# Patient Record
Sex: FEMALE | Race: WHITE | Employment: OTHER | URBAN - METROPOLITAN AREA
[De-identification: names, ages, dates, MRNs, and addresses within clinical notes are randomized per-mention and may not be internally consistent; named-entity substitution may affect disease eponyms.]

---

## 2019-05-04 ENCOUNTER — APPOINTMENT (OUTPATIENT)
Dept: GENERAL RADIOLOGY | Age: 84
End: 2019-05-04
Attending: EMERGENCY MEDICINE
Payer: MEDICARE

## 2019-05-04 ENCOUNTER — HOSPITAL ENCOUNTER (OUTPATIENT)
Age: 84
Setting detail: OBSERVATION
Discharge: HOME | End: 2019-05-05
Attending: EMERGENCY MEDICINE | Admitting: EMERGENCY MEDICINE
Payer: MEDICARE

## 2019-05-04 DIAGNOSIS — I45.9 HEART BLOCK: ICD-10-CM

## 2019-05-04 DIAGNOSIS — R50.9 FEVER, UNSPECIFIED FEVER CAUSE: Primary | ICD-10-CM

## 2019-05-04 DIAGNOSIS — R41.0 DELIRIUM: ICD-10-CM

## 2019-05-04 LAB
ALBUMIN SERPL-MCNC: 3.5 G/DL (ref 3.4–5)
ALBUMIN/GLOB SERPL: 1.2 {RATIO} (ref 0.8–1.7)
ALP SERPL-CCNC: 52 U/L (ref 45–117)
ALT SERPL-CCNC: 19 U/L (ref 13–56)
ANION GAP SERPL CALC-SCNC: 10 MMOL/L (ref 3–18)
APPEARANCE UR: CLEAR
AST SERPL-CCNC: 21 U/L (ref 15–37)
BACTERIA URNS QL MICRO: ABNORMAL /HPF
BASOPHILS # BLD: 0 K/UL (ref 0–0.1)
BASOPHILS NFR BLD: 0 % (ref 0–2)
BILIRUB SERPL-MCNC: 0.4 MG/DL (ref 0.2–1)
BILIRUB UR QL: NEGATIVE
BUN SERPL-MCNC: 18 MG/DL (ref 7–18)
BUN/CREAT SERPL: 18 (ref 12–20)
CALCIUM SERPL-MCNC: 8.4 MG/DL (ref 8.5–10.1)
CHLORIDE SERPL-SCNC: 103 MMOL/L (ref 100–108)
CK MB CFR SERPL CALC: 0.9 % (ref 0–4)
CK MB SERPL-MCNC: 1.2 NG/ML (ref 5–25)
CK SERPL-CCNC: 133 U/L (ref 26–192)
CO2 SERPL-SCNC: 24 MMOL/L (ref 21–32)
COLOR UR: YELLOW
CREAT SERPL-MCNC: 1.01 MG/DL (ref 0.6–1.3)
DIFFERENTIAL METHOD BLD: ABNORMAL
EOSINOPHIL # BLD: 0 K/UL (ref 0–0.4)
EOSINOPHIL NFR BLD: 0 % (ref 0–5)
EPITH CASTS URNS QL MICRO: 0 /LPF (ref 0–5)
ERYTHROCYTE [DISTWIDTH] IN BLOOD BY AUTOMATED COUNT: 15.3 % (ref 11.6–14.5)
GLOBULIN SER CALC-MCNC: 3 G/DL (ref 2–4)
GLUCOSE SERPL-MCNC: 150 MG/DL (ref 74–99)
GLUCOSE UR STRIP.AUTO-MCNC: NEGATIVE MG/DL
HCT VFR BLD AUTO: 34.6 % (ref 35–45)
HGB BLD-MCNC: 11.1 G/DL (ref 12–16)
HGB UR QL STRIP: ABNORMAL
KETONES UR QL STRIP.AUTO: ABNORMAL MG/DL
LACTATE BLD-SCNC: 1.97 MMOL/L (ref 0.4–2)
LEUKOCYTE ESTERASE UR QL STRIP.AUTO: NEGATIVE
LYMPHOCYTES # BLD: 0.5 K/UL (ref 0.9–3.6)
LYMPHOCYTES NFR BLD: 7 % (ref 21–52)
MCH RBC QN AUTO: 30.2 PG (ref 24–34)
MCHC RBC AUTO-ENTMCNC: 32.1 G/DL (ref 31–37)
MCV RBC AUTO: 94 FL (ref 74–97)
MONOCYTES # BLD: 0.6 K/UL (ref 0.05–1.2)
MONOCYTES NFR BLD: 7 % (ref 3–10)
NEUTS SEG # BLD: 6.8 K/UL (ref 1.8–8)
NEUTS SEG NFR BLD: 86 % (ref 40–73)
NITRITE UR QL STRIP.AUTO: NEGATIVE
PH UR STRIP: 7 [PH] (ref 5–8)
PLATELET # BLD AUTO: 169 K/UL (ref 135–420)
PMV BLD AUTO: 10.2 FL (ref 9.2–11.8)
POTASSIUM SERPL-SCNC: 3.9 MMOL/L (ref 3.5–5.5)
PROT SERPL-MCNC: 6.5 G/DL (ref 6.4–8.2)
PROT UR STRIP-MCNC: 30 MG/DL
RBC # BLD AUTO: 3.68 M/UL (ref 4.2–5.3)
RBC #/AREA URNS HPF: ABNORMAL /HPF (ref 0–5)
SODIUM SERPL-SCNC: 137 MMOL/L (ref 136–145)
SP GR UR REFRACTOMETRY: 1.02 (ref 1–1.03)
TROPONIN I SERPL-MCNC: <0.02 NG/ML (ref 0–0.04)
UROBILINOGEN UR QL STRIP.AUTO: 1 EU/DL (ref 0.2–1)
WBC # BLD AUTO: 7.9 K/UL (ref 4.6–13.2)
WBC URNS QL MICRO: ABNORMAL /HPF (ref 0–5)

## 2019-05-04 PROCEDURE — 99285 EMERGENCY DEPT VISIT HI MDM: CPT

## 2019-05-04 PROCEDURE — 83605 ASSAY OF LACTIC ACID: CPT

## 2019-05-04 PROCEDURE — 85025 COMPLETE CBC W/AUTO DIFF WBC: CPT

## 2019-05-04 PROCEDURE — 87040 BLOOD CULTURE FOR BACTERIA: CPT

## 2019-05-04 PROCEDURE — 74011250637 HC RX REV CODE- 250/637: Performed by: EMERGENCY MEDICINE

## 2019-05-04 PROCEDURE — 93005 ELECTROCARDIOGRAM TRACING: CPT

## 2019-05-04 PROCEDURE — 80053 COMPREHEN METABOLIC PANEL: CPT

## 2019-05-04 PROCEDURE — 71045 X-RAY EXAM CHEST 1 VIEW: CPT

## 2019-05-04 PROCEDURE — 74011000250 HC RX REV CODE- 250: Performed by: EMERGENCY MEDICINE

## 2019-05-04 PROCEDURE — 81001 URINALYSIS AUTO W/SCOPE: CPT

## 2019-05-04 PROCEDURE — 74011250636 HC RX REV CODE- 250/636: Performed by: EMERGENCY MEDICINE

## 2019-05-04 PROCEDURE — 87086 URINE CULTURE/COLONY COUNT: CPT

## 2019-05-04 PROCEDURE — 96374 THER/PROPH/DIAG INJ IV PUSH: CPT

## 2019-05-04 PROCEDURE — 82550 ASSAY OF CK (CPK): CPT

## 2019-05-04 PROCEDURE — 96361 HYDRATE IV INFUSION ADD-ON: CPT

## 2019-05-04 RX ORDER — PRAVASTATIN SODIUM 10 MG/1
TABLET ORAL
COMMUNITY

## 2019-05-04 RX ORDER — PREDNISONE 1 MG/1
TABLET ORAL
COMMUNITY

## 2019-05-04 RX ORDER — OXYCODONE AND ACETAMINOPHEN 10; 325 MG/1; MG/1
TABLET ORAL
COMMUNITY

## 2019-05-04 RX ORDER — ACETAMINOPHEN 325 MG/1
650 TABLET ORAL
Status: COMPLETED | OUTPATIENT
Start: 2019-05-04 | End: 2019-05-04

## 2019-05-04 RX ORDER — SODIUM CHLORIDE 0.9 % (FLUSH) 0.9 %
5-10 SYRINGE (ML) INJECTION AS NEEDED
Status: DISCONTINUED | OUTPATIENT
Start: 2019-05-04 | End: 2019-05-05 | Stop reason: HOSPADM

## 2019-05-04 RX ADMIN — WATER 2 G: 1 INJECTION INTRAMUSCULAR; INTRAVENOUS; SUBCUTANEOUS at 22:00

## 2019-05-04 RX ADMIN — ACETAMINOPHEN 650 MG: 325 TABLET ORAL at 21:23

## 2019-05-04 RX ADMIN — SODIUM CHLORIDE 1000 ML: 900 INJECTION, SOLUTION INTRAVENOUS at 21:23

## 2019-05-05 ENCOUNTER — APPOINTMENT (OUTPATIENT)
Dept: CT IMAGING | Age: 84
End: 2019-05-05
Attending: EMERGENCY MEDICINE
Payer: MEDICARE

## 2019-05-05 VITALS
HEART RATE: 66 BPM | SYSTOLIC BLOOD PRESSURE: 115 MMHG | WEIGHT: 125 LBS | RESPIRATION RATE: 18 BRPM | DIASTOLIC BLOOD PRESSURE: 42 MMHG | BODY MASS INDEX: 17.9 KG/M2 | HEIGHT: 70 IN | TEMPERATURE: 98.5 F | OXYGEN SATURATION: 95 %

## 2019-05-05 PROBLEM — R41.0 DELIRIUM: Status: ACTIVE | Noted: 2019-05-05

## 2019-05-05 PROBLEM — I45.9 HEART BLOCK: Status: ACTIVE | Noted: 2019-05-05

## 2019-05-05 PROBLEM — R50.9 FEVER: Status: ACTIVE | Noted: 2019-05-05

## 2019-05-05 PROCEDURE — 74011250637 HC RX REV CODE- 250/637: Performed by: FAMILY MEDICINE

## 2019-05-05 PROCEDURE — 72132 CT LUMBAR SPINE W/DYE: CPT

## 2019-05-05 PROCEDURE — 99218 HC RM OBSERVATION: CPT

## 2019-05-05 PROCEDURE — 74011636637 HC RX REV CODE- 636/637: Performed by: FAMILY MEDICINE

## 2019-05-05 PROCEDURE — 74011250636 HC RX REV CODE- 250/636: Performed by: EMERGENCY MEDICINE

## 2019-05-05 PROCEDURE — 74011250636 HC RX REV CODE- 250/636: Performed by: FAMILY MEDICINE

## 2019-05-05 PROCEDURE — 74011636320 HC RX REV CODE- 636/320: Performed by: EMERGENCY MEDICINE

## 2019-05-05 PROCEDURE — 70450 CT HEAD/BRAIN W/O DYE: CPT

## 2019-05-05 PROCEDURE — 96372 THER/PROPH/DIAG INJ SC/IM: CPT

## 2019-05-05 PROCEDURE — 96367 TX/PROPH/DG ADDL SEQ IV INF: CPT

## 2019-05-05 RX ORDER — LEVOTHYROXINE SODIUM 50 UG/1
50 TABLET ORAL
Status: DISCONTINUED | OUTPATIENT
Start: 2019-05-06 | End: 2019-05-05 | Stop reason: HOSPADM

## 2019-05-05 RX ORDER — SODIUM CHLORIDE 9 MG/ML
125 INJECTION, SOLUTION INTRAVENOUS CONTINUOUS
Status: DISCONTINUED | OUTPATIENT
Start: 2019-05-05 | End: 2019-05-05 | Stop reason: HOSPADM

## 2019-05-05 RX ORDER — SODIUM CHLORIDE 0.9 % (FLUSH) 0.9 %
5-40 SYRINGE (ML) INJECTION EVERY 8 HOURS
Status: DISCONTINUED | OUTPATIENT
Start: 2019-05-05 | End: 2019-05-05 | Stop reason: HOSPADM

## 2019-05-05 RX ORDER — HEPARIN SODIUM 5000 [USP'U]/ML
5000 INJECTION, SOLUTION INTRAVENOUS; SUBCUTANEOUS EVERY 8 HOURS
Status: DISCONTINUED | OUTPATIENT
Start: 2019-05-05 | End: 2019-05-05 | Stop reason: HOSPADM

## 2019-05-05 RX ORDER — ASPIRIN 325 MG
325 TABLET ORAL DAILY
Status: DISCONTINUED | OUTPATIENT
Start: 2019-05-05 | End: 2019-05-05 | Stop reason: HOSPADM

## 2019-05-05 RX ORDER — LEVOTHYROXINE SODIUM 50 UG/1
50 TABLET ORAL
Qty: 30 TAB | Refills: 0 | Status: SHIPPED | OUTPATIENT
Start: 2019-05-06

## 2019-05-05 RX ORDER — AZITHROMYCIN 250 MG/1
250 TABLET, FILM COATED ORAL DAILY
Status: DISCONTINUED | OUTPATIENT
Start: 2019-05-06 | End: 2019-05-05 | Stop reason: HOSPADM

## 2019-05-05 RX ORDER — FOLIC ACID 1 MG/1
1 TABLET ORAL DAILY
Status: DISCONTINUED | OUTPATIENT
Start: 2019-05-05 | End: 2019-05-05 | Stop reason: HOSPADM

## 2019-05-05 RX ORDER — ACETAMINOPHEN 325 MG/1
650 TABLET ORAL
Status: DISCONTINUED | OUTPATIENT
Start: 2019-05-05 | End: 2019-05-05 | Stop reason: HOSPADM

## 2019-05-05 RX ORDER — LEVOTHYROXINE SODIUM 50 UG/1
50 TABLET ORAL
Status: DISCONTINUED | OUTPATIENT
Start: 2019-05-05 | End: 2019-05-05

## 2019-05-05 RX ORDER — OXYCODONE AND ACETAMINOPHEN 5; 325 MG/1; MG/1
1 TABLET ORAL
Status: DISCONTINUED | OUTPATIENT
Start: 2019-05-05 | End: 2019-05-05 | Stop reason: HOSPADM

## 2019-05-05 RX ORDER — AZITHROMYCIN 250 MG/1
TABLET, FILM COATED ORAL
Qty: 6 TAB | Refills: 0 | Status: SHIPPED | OUTPATIENT
Start: 2019-05-06 | End: 2019-05-10

## 2019-05-05 RX ORDER — PREDNISONE 5 MG/1
5 TABLET ORAL
Status: DISCONTINUED | OUTPATIENT
Start: 2019-05-05 | End: 2019-05-05 | Stop reason: HOSPADM

## 2019-05-05 RX ORDER — PRAVASTATIN SODIUM 20 MG/1
10 TABLET ORAL
Status: DISCONTINUED | OUTPATIENT
Start: 2019-05-05 | End: 2019-05-05 | Stop reason: HOSPADM

## 2019-05-05 RX ORDER — SODIUM CHLORIDE 0.9 % (FLUSH) 0.9 %
5-40 SYRINGE (ML) INJECTION AS NEEDED
Status: DISCONTINUED | OUTPATIENT
Start: 2019-05-05 | End: 2019-05-05 | Stop reason: HOSPADM

## 2019-05-05 RX ORDER — ASPIRIN 325 MG
325 TABLET ORAL DAILY
COMMUNITY

## 2019-05-05 RX ADMIN — PRAVASTATIN SODIUM 10 MG: 20 TABLET ORAL at 04:43

## 2019-05-05 RX ADMIN — FOLIC ACID 1 MG: 1 TABLET ORAL at 09:05

## 2019-05-05 RX ADMIN — LEVOTHYROXINE SODIUM 50 MCG: 50 TABLET ORAL at 06:04

## 2019-05-05 RX ADMIN — PREDNISONE 5 MG: 5 TABLET ORAL at 09:05

## 2019-05-05 RX ADMIN — ACETAMINOPHEN 650 MG: 325 TABLET ORAL at 04:51

## 2019-05-05 RX ADMIN — IOPAMIDOL 100 ML: 612 INJECTION, SOLUTION INTRAVENOUS at 00:20

## 2019-05-05 RX ADMIN — SODIUM CHLORIDE 500 MG: 900 INJECTION, SOLUTION INTRAVENOUS at 04:48

## 2019-05-05 RX ADMIN — SODIUM CHLORIDE 125 ML/HR: 900 INJECTION, SOLUTION INTRAVENOUS at 04:41

## 2019-05-05 RX ADMIN — HEPARIN SODIUM 5000 UNITS: 5000 INJECTION INTRAVENOUS; SUBCUTANEOUS at 11:48

## 2019-05-05 RX ADMIN — HEPARIN SODIUM 5000 UNITS: 5000 INJECTION INTRAVENOUS; SUBCUTANEOUS at 04:43

## 2019-05-05 RX ADMIN — ASPIRIN 325 MG ORAL TABLET 325 MG: 325 PILL ORAL at 09:05

## 2019-05-05 NOTE — PROGRESS NOTES
Hospitalist Progress Note Patient: Nico Fiore MRN: [id number]  CSN: [id number] YOB: 1933  Age: 80 y.o. Sex: female DOA: 5/4/2019 LOS:  LOS: 0 days Assessment and Plan: 
 
Principal Problem: 
  Delirium (5/5/2019) Active Problems: 
  Fever (5/5/2019) Heart block (5/5/2019) She is back to baseline and watns to go home  . She understands she had a transient episode of heart block and this is potentially a problem. She understands risk and wants to drive back to [facility] anyway Chief complaint:  Altered mental status Subjective: 
 
Feels back to normal   
 
 
Review of systems: 
 
General: No fevers or chills. Cardiovascular: No chest pain or pressure. No palpitations. Pulmonary: No shortness of breath. Gastrointestinal: No nausea, vomiting. Objective: 
 
Vital signs/Intake and Output: 
Visit Vitals /42 (BP 1 Location: Left arm, BP Patient Position: At rest;Supine) Pulse 66 Temp 98.5 °F (36.9 °C) Resp 18 Ht 5' 10\" (1.778 m) Wt 56.7 kg (125 lb) SpO2 95% BMI 17.94 kg/m² Current Shift:  05/05 0701 - 05/05 1900 In: 240 [P.O.:240] Out: - Last three shifts:  05/03 1901 - 05/05 0700 In: 659.6 [P.O.:120; I.V.:539.6] Out: 300 [Urine:300] Physical Exam: 
General: NAD, AAOx3. Non-toxic. HEENT: NC/AT. PERRLA, EOMI.  MMM. Lungs: Nml inspection. CTA B/L. No wheezing, rales or rhonchi. Heart:  S1S2 RRR,  PMI mid 5th IC space. No M/RG. Abdomen: Soft, NT/ND.  BS+. No peritoneal signs. Extremities: No C/C/E. Psych:   Nml affect. Neurologic:  2-12 intact. Strength 5/5 throughout. Sensation symmetrical. 
 
 
 
 
Labs: Results:  
   
Chemistry Recent Labs 05/04/19 2058 *   
K 3.9  CO2 24 BUN 18 CREA 1.01  
CA 8.4* AGAP 10 BUCR 18 AP 52  
TP 6.5 ALB 3.5 GLOB 3.0 AGRAT 1.2  
  
CBC w/Diff Recent Labs 05/04/19 2058 WBC 7.9  
RBC 3.68* HGB 11.1*  
HCT 34.6*  
  GRANS 86* LYMPH 7*  
EOS 0 Cardiac Enzymes Recent Labs 05/04/19 2058  CKND1 0.9 Coagulation No results for input(s): PTP, INR, APTT in the last 72 hours. No lab exists for component: INREXT, INREXT Lipid Panel No results found for: CHOL, CHOLPOCT, CHOLX, CHLST, CHOLV, 394941, HDL, LDL, LDLC, DLDLP, 169873, VLDLC, VLDL, TGLX, TRIGL, TRIGP, TGLPOCT, CHHD, CHHDX  
BNP No results for input(s): BNPP in the last 72 hours. Liver Enzymes Recent Labs 05/04/19 2058 TP 6.5 ALB 3.5 AP 52 SGOT 21 Thyroid Studies No results found for: T4, T3U, TSH, TSHEXT, TSHEXT Procedures/imaging: see electronic medical records for all procedures/Xrays and details which were not copied into this note but were reviewed prior to creation of Plan

## 2019-05-05 NOTE — DISCHARGE SUMMARY
Discharge Summary    Patient: Franki Marquis MRN: [id number]  CSN: [id number]    YOB: 1933  Age: 80 y.o. Sex: female    DOA: 5/4/2019 LOS:  LOS: 0 days   Discharge Date:      Primary Care Provider:  Other, MD Yenifer    Admission Diagnoses: Fever [R50.9]  Delirium [R41.0]    Discharge Diagnoses:    Problem List as of 5/5/2019 Date Reviewed: 5/5/2019          Codes Class Noted - Resolved    * (Principal) Delirium ICD-10-CM: R41.0  ICD-9-CM: 780.09  5/5/2019 - Present        Fever ICD-10-CM: R50.9  ICD-9-CM: 780.60  5/5/2019 - Present        Heart block ICD-10-CM: I45.9  ICD-9-CM: 426.9  5/5/2019 - Present              Discharge Medications:     Current Discharge Medication List      START taking these medications    Details   azithromycin (ZITHROMAX) 250 mg tablet Take once a day  Qty: 6 Tab, Refills: 0      levothyroxine (SYNTHROID) 50 mcg tablet Take 1 Tab by mouth every morning. Qty: 30 Tab, Refills: 0         CONTINUE these medications which have NOT CHANGED    Details   aspirin (ASPIRIN) 325 mg tablet Take 325 mg by mouth daily. Indications: Joint Damage causing Pain and Loss of Function      pravastatin (PRAVACHOL) 10 mg tablet Take  by mouth nightly. predniSONE (DELTASONE) 1 mg tablet Take  by mouth daily (with breakfast). oxyCODONE-acetaminophen (PERCOCET)  mg per tablet Take  by mouth. Discharge Condition: stable        Consults: cardiology       PHYSICAL EXAM   Visit Vitals  /42 (BP 1 Location: Left arm, BP Patient Position: At rest;Supine)   Pulse 66   Temp 98.5 °F (36.9 °C)   Resp 18   Ht 5' 10\" (1.778 m)   Wt 56.7 kg (125 lb)   SpO2 95%   BMI 17.94 kg/m²     General: Awake, cooperative, no acute distress    HEENT: NC, Atraumatic. PERRLA, EOMI. Anicteric sclerae. Lungs:  CTA Bilaterally. No Wheezing/Rhonchi/Rales. Heart:  Regular  rhythm,  No murmur, No Rubs, No Gallops  Abdomen: Soft, Non distended, Non tender.  +Bowel sounds,   Extremities: No c/c/e  Psych:   Not anxious or agitated. Neurologic:  No acute neurological deficits. Admission HPI : Colleen Griffin is a 80 y.o. female who presented to ER with fever, cough, and altered mental status today.  and daughter said it was difficult to get her out of bed and she also had an episode of urinary incontinence which is unusual.  She is visiting from out of town for a . She has a history of RA on MTX started about 6 weeks ago and chronic daily prednisone. She was feeling well prior to today. Hospital Course : Admitted and placed on telemetry and given Zithromax IV empirically for cough and IVfluids. She resolved back to baseline and wanted to go [place]. She had one episode of probably transient heart block but family wants to return to [place] to get treat ent there despite risk. We will try and follo wup on urine culture but she knows it will be hard if she travels. She will go home on zithromax which isnt best drug for this. Activity: ad bertram      Diet: regular    Follow-up: With PCP in [place]    Disposition: Home      Minutes spent on discharge: 35        Labs: Results:       Chemistry Recent Labs     19   *      K 3.9      CO2 24   BUN 18   CREA 1.01   CA 8.4*   AGAP 10   BUCR 18   AP 52   TP 6.5   ALB 3.5   GLOB 3.0   AGRAT 1.2      CBC w/Diff Recent Labs     19   WBC 7.9   RBC 3.68*   HGB 11.1*   HCT 34.6*      GRANS 86*   LYMPH 7*   EOS 0      Cardiac Enzymes Recent Labs     19      CKND1 0.9      Coagulation No results for input(s): PTP, INR, APTT in the last 72 hours. No lab exists for component: INREXT, INREXT    Lipid Panel No results found for: CHOL, CHOLPOCT, CHOLX, CHLST, CHOLV, 108980, HDL, LDL, LDLC, DLDLP, 470043, VLDLC, VLDL, TGLX, TRIGL, TRIGP, TGLPOCT, CHHD, CHHDX   BNP No results for input(s): BNPP in the last 72 hours.    Liver Enzymes Recent Labs 05/04/19 2058   TP 6.5   ALB 3.5   AP 52   SGOT 21      Thyroid Studies No results found for: T4, T3U, TSH, TSHEXT, TSHEXT         Significant Diagnostic Studies: Ct Head Wo Cont    Result Date: 5/5/2019  EXAM: CT head INDICATION: Decreased alertness COMPARISON: None. TECHNIQUE: Axial CT imaging of the head was performed without intravenous contrast. One or more dose reduction techniques were used on this CT: automated exposure control, adjustment of the mAs and/or kVp according to patient size, and iterative reconstruction techniques. The specific techniques used on this CT exam have been documented in the patient's electronic medical record. Digital Imaging and Communications in Medicine (DICOM) format image data are available to nonaffiliated external healthcare facilities or entities on a secure, media free, reciprocally searchable basis with patient authorization for at least a 12 month period after this study. _______________ FINDINGS: BRAIN AND POSTERIOR FOSSA: The sulci, folia, ventricles and basal cisterns are within normal limits for the patient's age with atrophy present. There is no intracranial hemorrhage, mass effect, or midline shift. Mild periventricular low-attenuation is noted. Though nonspecific this is most often seen with chronic small vessel ischemic change. Otherwise, there are no  areas of abnormal parenchymal attenuation. EXTRA-AXIAL SPACES AND MENINGES: There are no abnormal extra-axial fluid collections. CALVARIUM: Intact. SINUSES: Opacification and mucoperiosteal thickening throughout ethmoid air cells and involving sphenoid air cells also, chronic appearing. No sinus air-fluid levels. OTHER: None. _______________     IMPRESSION: No acute intracranial abnormalities. Chronic sinusitis.     Xr Chest Port    Result Date: 5/4/2019  ============================ [facility] ============================ EXAM:AP chest xray INDICATION: Fever, tachycardia COMPARISON: None FINDINGS: The cardiomediastinal silhouette is normal. Appears to be a prosthetic heart valve. The lungs are clear. The pulmonary vasculature is not engorged. The costophrenic angles are well defined. No pneumothorax is detected. There are Bosch type rods at the thoracolumbar spine. . The trachea is midline. Suggestion of soft tissue ossification projecting adjacent to proximal left humerus. IMPRESSION: No acute cardiopulmonary process.                  CC: Other, MD Yenifer

## 2019-05-05 NOTE — ED NOTES
Pt hourly rounding competed. Safety Pt () resting on stretcher with side rails up and call bell in reach. () in chair 
  () in parents arms. Toileting Pt offered ()Bedpan 
   ()Assistance to Restroom 
   ()Urinal 
Ongoing UpdatesUpdated on plan of care and status of test results. Pain Management Inquired as to comfort and offered comfort measures: 
  () warm blankets 
 () dimmed lights

## 2019-05-05 NOTE — PROGRESS NOTES
Problem: Falls - Risk of 
Goal: *Absence of Falls Description Document Roselie Blount Fall Risk and appropriate interventions in the flowsheet. Outcome: Progressing Towards Goal 
  
Problem: Patient Education: Go to Patient Education Activity Goal: Patient/Family Education Outcome: Progressing Towards Goal 
  
Problem: Falls - Risk of 
Goal: *Absence of Falls Description Document Roselie Blount Fall Risk and appropriate interventions in the flowsheet. Outcome: Progressing Towards Goal 
  
Problem: Patient Education: Go to Patient Education Activity Goal: Patient/Family Education Outcome: Progressing Towards Goal 
  
Problem: Pressure Injury - Risk of 
Goal: *Prevention of pressure injury Description Document Nicholas Scale and appropriate interventions in the flowsheet. Outcome: Progressing Towards Goal 
  
Problem: Patient Education: Go to Patient Education Activity Goal: Patient/Family Education Outcome: Progressing Towards Goal 
  
Problem: Aspiration - Risk of 
Goal: *Absence of aspiration Outcome: Progressing Towards Goal 
  
Problem: Patient Education: Go to Patient Education Activity Goal: Patient/Family Education Outcome: Progressing Towards Goal 
  
Problem: Urinary Tract Infection - Adult Goal: *Absence of infection signs and symptoms Outcome: Progressing Towards Goal 
  
Problem: Patient Education: Go to Patient Education Activity Goal: Patient/Family Education Outcome: Progressing Towards Goal 
  
Problem: Activity Intolerance Goal: *Oxygen saturation during activity within specified parameters Outcome: Progressing Towards Goal 
Goal: *Able to remain out of bed as prescribed Outcome: Progressing Towards Goal 
  
Problem: Patient Education: Go to Patient Education Activity Goal: Patient/Family Education Outcome: Progressing Towards Goal

## 2019-05-05 NOTE — ED PROVIDER NOTES
EMERGENCY DEPARTMENT HISTORY AND PHYSICAL EXAM 
 
Date: 5/4/2019 Patient Name: Sohail Magaña History of Presenting Illness Chief Complaint Patient presents with  Fever History Provided By: Patient Chief Complaint: Fever Duration: 1 Days Timing:  Acute Location: N/A Quality: Tmax in .2F Severity: Mild Modifying Factors: No modifying factors Associated Symptoms: cough, decreased activity, confusion Additional History (Context):  
9:07 PM 
Soahil Magaña is a 80 y.o. female with no pertinent PMHX who presents via EMS to the emergency department C/O fever (Tmax in . 2F) onset 1 day ago. Associated sxs include cough, decreased activity, confusion. Pt denies myalgias, and any other sxs or complaints. PCP: Other, MD Yenifer 
 
Current Facility-Administered Medications Medication Dose Route Frequency Provider Last Rate Last Dose  sodium chloride (NS) flush 5-10 mL  5-10 mL IntraVENous PRN Uzair Shelton MD      
 vancomycin (VANCOCIN) 1,000 mg in 0.9% sodium chloride 250 mL IVPB  1,000 mg IntraVENous NOW Uzair Shelton  mL/hr at 05/05/19 0047 1,000 mg at 05/05/19 [phone] Current Outpatient Medications Medication Sig Dispense Refill  pravastatin (PRAVACHOL) 10 mg tablet Take  by mouth nightly.  predniSONE (DELTASONE) 1 mg tablet Take  by mouth daily (with breakfast).  oxyCODONE-acetaminophen (PERCOCET)  mg per tablet Take  by mouth. Past History Past Medical History: 
History reviewed. No pertinent past medical history. Past Surgical History: 
Past Surgical History:  
Procedure Laterality Date  HX ARTERIAL BYPASS Family History: 
History reviewed. No pertinent family history. Social History: 
Social History Tobacco Use  Smoking status: Never Smoker Substance Use Topics  Alcohol use: Not Currently  Drug use: Never Allergies: Allergies Allergen Reactions  Pcn [Penicillins] Other (comments)  
  unknown  Tetanus And Diphther. Tox (Pf) Other (comments)  
  unknown Review of Systems Review of Systems Constitutional: Positive for activity change (decreased) and fever. Respiratory: Positive for cough. Genitourinary: Positive for decreased urine volume. Musculoskeletal: Negative for myalgias. Psychiatric/Behavioral: Positive for confusion. All other systems reviewed and are negative. Physical Exam  
 
Vitals:  
 05/04/19 2313 05/04/19 2340 05/05/19 0045 05/05/19 0130 BP:   128/47 138/53 Pulse:  91  91 Resp:  20  19 Temp: 98.7 °F (37.1 °C) SpO2:      
Weight:      
Height:      
 
Physical Exam  
Constitutional: She appears well-developed and well-nourished. No distress. Sleepy does arouse with light touch and loud voice HENT:  
Head: Normocephalic and atraumatic. Right Ear: External ear normal.  
Left Ear: External ear normal.  
Mouth/Throat: Oropharynx is clear and moist. Mucous membranes are dry. No oropharyngeal exudate. Eyes: Pupils are equal, round, and reactive to light. Conjunctivae and EOM are normal. No scleral icterus. Mild pallor Tissue sunken Neck: Normal range of motion. Neck supple. No JVD present. No tracheal deviation present. No thyromegaly present. Cardiovascular: Regular rhythm and normal heart sounds. Tachycardia present. Pulmonary/Chest: Effort normal and breath sounds normal. No stridor. No respiratory distress. Diminished respiratory effort Abdominal: Soft. Bowel sounds are normal. She exhibits no distension. There is no tenderness. There is no rebound and no guarding. Musculoskeletal: Normal range of motion. She exhibits no edema or tenderness. No soft tissue injuries Lymphadenopathy:  
  She has no cervical adenopathy. Neurological: She has normal reflexes. No cranial nerve deficit. Coordination normal. GCS eye subscore is 3. GCS verbal subscore is 4.  GCS motor subscore is 6. Diminished mental status Skin: Skin is warm and dry. No rash noted. She is not diaphoretic. No erythema. Poor turgor Psychiatric: She has a normal mood and affect. Her behavior is normal. Judgment and thought content normal.  
Nursing note and vitals reviewed. Diagnostic Study Results Labs - Recent Results (from the past 12 hour(s)) POC LACTIC ACID Collection Time: 05/04/19  8:51 PM  
Result Value Ref Range Lactic Acid (POC) 1.97 0.40 - 2.00 mmol/L  
CBC WITH AUTOMATED DIFF Collection Time: 05/04/19  8:58 PM  
Result Value Ref Range WBC 7.9 4.6 - 13.2 K/uL  
 RBC 3.68 (L) 4.20 - 5.30 M/uL  
 HGB 11.1 (L) 12.0 - 16.0 g/dL HCT 34.6 (L) 35.0 - 45.0 % MCV 94.0 74.0 - 97.0 FL  
 MCH 30.2 24.0 - 34.0 PG  
 MCHC 32.1 31.0 - 37.0 g/dL  
 RDW 15.3 (H) 11.6 - 14.5 % PLATELET 817 988 - 247 K/uL MPV 10.2 9.2 - 11.8 FL  
 NEUTROPHILS 86 (H) 40 - 73 % LYMPHOCYTES 7 (L) 21 - 52 % MONOCYTES 7 3 - 10 % EOSINOPHILS 0 0 - 5 % BASOPHILS 0 0 - 2 %  
 ABS. NEUTROPHILS 6.8 1.8 - 8.0 K/UL  
 ABS. LYMPHOCYTES 0.5 (L) 0.9 - 3.6 K/UL  
 ABS. MONOCYTES 0.6 0.05 - 1.2 K/UL  
 ABS. EOSINOPHILS 0.0 0.0 - 0.4 K/UL  
 ABS. BASOPHILS 0.0 0.0 - 0.1 K/UL  
 DF AUTOMATED CARDIAC PANEL,(CK, CKMB & TROPONIN) Collection Time: 05/04/19  8:58 PM  
Result Value Ref Range  26 - 192 U/L  
 CK - MB 1.2 <3.6 ng/ml CK-MB Index 0.9 0.0 - 4.0 % Troponin-I, QT <0.02 0.0 - 8.001 NG/ML  
METABOLIC PANEL, COMPREHENSIVE Collection Time: 05/04/19  8:58 PM  
Result Value Ref Range Sodium 137 136 - 145 mmol/L Potassium 3.9 3.5 - 5.5 mmol/L Chloride 103 100 - 108 mmol/L  
 CO2 24 21 - 32 mmol/L Anion gap 10 3.0 - 18 mmol/L Glucose 150 (H) 74 - 99 mg/dL BUN 18 7.0 - 18 MG/DL Creatinine 1.01 0.6 - 1.3 MG/DL  
 BUN/Creatinine ratio 18 12 - 20 GFR est AA >60 >60 ml/min/1.73m2 GFR est non-AA 52 (L) >60 ml/min/1.73m2 Calcium 8.4 (L) 8.5 - 10.1 MG/DL Bilirubin, total 0.4 0.2 - 1.0 MG/DL  
 ALT (SGPT) 19 13 - 56 U/L  
 AST (SGOT) 21 15 - 37 U/L Alk. phosphatase 52 45 - 117 U/L Protein, total 6.5 6.4 - 8.2 g/dL Albumin 3.5 3.4 - 5.0 g/dL Globulin 3.0 2.0 - 4.0 g/dL A-G Ratio 1.2 0.8 - 1.7 EKG, 12 LEAD, INITIAL Collection Time: 05/04/19  9:26 PM  
Result Value Ref Range Ventricular Rate 100 BPM  
 Atrial Rate 100 BPM  
 P-R Interval 172 ms QRS Duration 128 ms Q-T Interval 370 ms QTC Calculation (Bezet) 477 ms Calculated P Axis 67 degrees Calculated R Axis 46 degrees Calculated T Axis 63 degrees Diagnosis Sinus rhythm with occasional premature ventricular complexes Right bundle branch block Septal infarct , age undetermined Abnormal ECG No previous ECGs available URINALYSIS W/ RFLX MICROSCOPIC Collection Time: 05/04/19  9:56 PM  
Result Value Ref Range Color YELLOW Appearance CLEAR Specific gravity 1.018 1.005 - 1.030    
 pH (UA) 7.0 5.0 - 8.0 Protein 30 (A) NEG mg/dL Glucose NEGATIVE  NEG mg/dL Ketone TRACE (A) NEG mg/dL Bilirubin NEGATIVE  NEG Blood TRACE (A) NEG Urobilinogen 1.0 0.2 - 1.0 EU/dL Nitrites NEGATIVE  NEG Leukocyte Esterase NEGATIVE  NEG    
URINE MICROSCOPIC ONLY Collection Time: 05/04/19  9:56 PM  
Result Value Ref Range WBC 0 to 3 0 - 5 /hpf  
 RBC 0 to 3 0 - 5 /hpf Epithelial cells 0 0 - 5 /lpf Bacteria FEW (A) NEG /hpf Radiologic Studies -  
CT HEAD WO CONT Final Result IMPRESSION:  
  
No acute intracranial abnormalities. Chronic sinusitis. XR CHEST PORT Final Result IMPRESSION:   
  
No acute cardiopulmonary process. CT SPINE LUMB W CONT    (Results Pending) CT Results  (Last 48 hours) 05/05/19 0055  CT HEAD WO CONT Final result Impression:  IMPRESSION:  
   
No acute intracranial abnormalities. Chronic sinusitis. Narrative:  EXAM: CT head INDICATION: Decreased alertness COMPARISON: None. TECHNIQUE: Axial CT imaging of the head was performed without intravenous  
contrast. One or more dose reduction techniques were used on this CT: automated  
exposure control, adjustment of the mAs and/or kVp according to patient size,  
and iterative reconstruction techniques. The specific techniques used on this CT exam have been documented in the patient's electronic medical record. Digital Imaging and Communications in Medicine (DICOM) format image data are  
available to nonaffiliated external healthcare facilities or entities on a  
secure, media free, reciprocally searchable basis with patient authorization for  
at least a 12 month period after this study. _______________ FINDINGS:  
   
BRAIN AND POSTERIOR FOSSA: The sulci, folia, ventricles and basal cisterns are  
within normal limits for the patient's age with atrophy present. There is no  
intracranial hemorrhage, mass effect, or midline shift. Mild periventricular  
low-attenuation is noted. Though nonspecific this is most often seen with  
chronic small vessel ischemic change. Otherwise, there are no  areas of abnormal  
parenchymal attenuation. EXTRA-AXIAL SPACES AND MENINGES: There are no abnormal extra-axial fluid  
collections. CALVARIUM: Intact. SINUSES: Opacification and mucoperiosteal thickening throughout ethmoid air  
cells and involving sphenoid air cells also, chronic appearing. No sinus  
air-fluid levels. OTHER: None.  
   
_______________ CXR Results  (Last 48 hours) 05/04/19 2135  XR CHEST PORT Final result Impression:  IMPRESSION:   
   
No acute cardiopulmonary process. Narrative:  ============================  
[facility]  
============================  
   
EXAM:AP chest xray INDICATION: Fever, tachycardia COMPARISON: None FINDINGS:   
   
The cardiomediastinal silhouette is normal. Appears to be a prosthetic heart  
valve. The lungs are clear. The pulmonary vasculature is not engorged. The  
costophrenic angles are well defined. No pneumothorax is detected. There are Bosch type rods at the thoracolumbar spine. . The trachea is  
midline. Suggestion of soft tissue ossification projecting adjacent to proximal  
left humerus. Medications given in the ED- Medications  
sodium chloride (NS) flush 5-10 mL (has no administration in time range)  
vancomycin (VANCOCIN) 1,000 mg in 0.9% sodium chloride 250 mL IVPB (1,000 mg IntraVENous New Bag 5/5/19 0047)  
sodium chloride 0.9 % bolus infusion 1,000 mL (0 mL IntraVENous IV Completed 5/4/19 2223)  
acetaminophen (TYLENOL) tablet 650 mg (650 mg Oral Given 5/4/19 2123) cefTRIAXone (ROCEPHIN) 2 g in sterile water (preservative free) 20 mL IV syringe (2 g IntraVENous Given 5/4/19 2200) iopamidol (ISOVUE 300) 61 % contrast injection 100 mL (100 mL IntraVENous Given 5/5/19 0020) Medical Decision Making I am the first provider for this patient. I reviewed the vital signs, available nursing notes, past medical history, past surgical history, family history and social history. Vital Signs-Reviewed the patient's vital signs. Pulse Oximetry Analysis - 96% on RA Cardiac Monitor: 
Rate: 103 bpm 
Rhythm: Sinus tachycardia EKG interpretation: (Preliminary) 9:00 PM  
Sinus tachycardia, 100 bpm, RBBB with typical ST segment changes EKG read by Allison Diaz. Jaylene Mancilla MD at 9:28 PM  
 
Records Reviewed: Nursing Notes and Old Medical Records Provider Notes (Medical Decision Making): Ddx: Lactic acid normal strongly suspicious of sepsis as patient has long term steroid use. Will give one liter of fluid and antibiotics. Cath for urine. Suspect pulmonary disease or PNA unlikely to have meningitis or encephalitis. No visible rash. Procedures: 
Procedures ED Course:  
9:07 PM Initial assessment performed. The patients presenting problems have been discussed, and they are in agreement with the care plan formulated and outlined with them. I have encouraged them to ask questions as they arise throughout their visit. 11:30 PM Pt looking better and now C/O back pain. No redness no induration well healed surgical scars with diffuse tenderness T10-L2. 
 
1:54 AM Discussed patient's history, exam, and available diagnostics results with Dr. Venessa Molina, internal medicine, who agrees with admission. Diagnosis and Disposition Critical Care Time: none Core Measures: 
For Hospitalized Patients: 
 
1. Hospitalization Decision Time: The decision to hospitalize the patient was made by Daniel Gallego MD at 9:07 PM on 5/5/2019 2. Aspirin: Aspirin was not given because the patient did not present with a stroke at the time of their Emergency Department evaluation 1:54 AM  Patient is being admitted to the hospital by Dr. Venessa Molina. The results of their tests and reasons for their admission have been discussed with them and/or available family. They convey agreement and understanding for the need to be admitted and for their admission diagnosis. CLINICAL IMPRESSION: 
 
No diagnosis found. PLAN: 
1. Admit _______________________________ Attestations: This note is prepared by [facility], acting as Scribe for Daniel Gallego MD. Daniel Gallego MD:  The scribe's documentation has been prepared under my direction and personally reviewed by me in its entirety. I confirm that the note above accurately reflects all work, treatment, procedures, and medical decision making performed by me. 
_______________________________

## 2019-05-05 NOTE — ED TRIAGE NOTES
Pt arrives with family c/o not feeling well and cough for past day. EMS stated that pt is febrile and tachy.

## 2019-05-05 NOTE — PROGRESS NOTES
0230: TRANSFER - IN REPORT: 
 
Verbal report received from [address] [place] [address] RN(name) on Roderick Conn  being received from ED(unit) for routine progression of care Report consisted of patients Situation, Background, Assessment and  
Recommendations(SBAR). Information from the following report(s) SBAR, Kardex, ED Summary and MAR was reviewed with the receiving nurse. Opportunity for questions and clarification was provided. Assessment completed upon patients arrival to unit and care assumed. 0304: Admission assessment completed. Patient is alert and oriented x4. Family is at bedside. Patient denied any pain or SOB. 0720: I was made aware by Nico Bnagura RN of patient having a third degree block this AM. I verbally reported to Dr Emiliano Eid at 1507:  
 
0730: Patient rested well overnight. No  New onset of chest pain or SOB. SOn spent the night at beside. Bedside and Verbal shift change report given to Harvey Blizzard, RN  (oncoming nurse) by Galen Guidry RN (offgoing nurse). Report included the following information SBAR, Kardex, Intake/Output, MAR, Accordion and Recent Results.

## 2019-05-05 NOTE — ROUTINE PROCESS
TRANSFER - OUT REPORT: 
 
Verbal report given to Barry RN(name) on Charity Stovall  being transferred to 3(unit) for routine progression of care Report consisted of patients Situation, Background, Assessment and  
Recommendations(SBAR). Information from the following report(s) SBAR [facility] ED SUMMARY was reviewed with the receiving nurse. Rhythm is Sinus Rhythm w/occasional PVC Lines:  
Saline Lock 05/05/19 Right Antecubital (Active) Site Assessment Clean, dry, & intact 5/5/2019  1:31 AM  
Phlebitis Assessment 0 5/5/2019  1:31 AM  
Infiltration Assessment 0 5/5/2019  1:31 AM  
Dressing Status Clean, dry, & intact 5/5/2019  1:31 AM  
Dressing Type Transparent 5/5/2019  1:31 AM  
Hub Color/Line Status Pink;Flushed;Patent 5/5/2019  1:31 AM  
Action Taken Blood drawn 5/5/2019  1:31 AM  
  
 
Opportunity for questions and clarification was provided. Patient transported with: 
 Monitor Tech

## 2019-05-05 NOTE — PROGRESS NOTES
0730-Bedside and Verbal shift change report given to [facility], [address] (oncoming nurse) by Cortes Segura (offgoing nurse). Report included the following information SBAR, Kardex, Intake/Output, MAR and Recent Results. 0800- Initial shift assessment complete, see EMR. 1049- Both IV access not working, attempted twice for another IV and unable. MD notified. 1200- Reassessment complete, no changes. 1315- Patient discharged home. Left with all belongings via wheelchair.

## 2019-05-05 NOTE — H&P
History & Physical 
Patient: Franki Marquis MRN: [id number]  CSN: [id number] YOB: 1933  Age: 80 y.o. Sex: female DOA: 2019 Primary Care Provider:  Yenifer Mayo MD 
 
 
Assessment/Plan Patient Active Problem List  
Diagnosis Code  Delirium R41.0  Fever R50.9 81 y/o female with fever, cough, and altered mental status today. Had full workup in the ER without a clear source of infection. Given that her fever and cough started about the same time and that she is immunosuppressed, will start azithromycin to cover for atypical pathogens. Blood cultures and urine cultures pending. UA without obvious sign of infection on micro. Head CT normal. She improved some with fluid hydration in the ER. Will admit for observation overnight and if she returns to baseline today she could potentially be discharged. Estimated length of stay : 1-2 days Charlotte Edwards MD 
Nocturnist 
 
CC: fever, altered mental status HPI:  
 
Franki Marquis is a 80 y.o. female who presented to ER with fever, cough, and altered mental status today.  and daughter said it was difficult to get her out of bed and she also had an episode of urinary incontinence which is unusual.  She is visiting from out of town for a . She has a history of RA on MTX started about 6 weeks ago and chronic daily prednisone. She was feeling well prior to today. Past Medical History:  
Diagnosis Date  Dementia  Hyperlipidemia  Hypothyroidism  Rheumatoid arthritis ([facility] [address] Past Surgical History:  
Procedure Laterality Date  HX AORTIC VALVE REPLACEMENT    
 HX ARTERIAL BYPASS  HX MITRAL VALVE REPLACEMENT History reviewed. No pertinent family history. Social History Socioeconomic History  Marital status:  Spouse name: Not on file  Number of children: Not on file  Years of education: Not on file  Highest education level: Not on file Tobacco Use  Smoking status: Never Smoker Substance and Sexual Activity  Alcohol use: Not Currently  Drug use: Never Prior to Admission medications Medication Sig Start Date End Date Taking? Authorizing Provider  
aspirin (ASPIRIN) 325 mg tablet Take 325 mg by mouth daily. Indications: Joint Damage causing Pain and Loss of Function   Yes Provider, Historical  
pravastatin (PRAVACHOL) 10 mg tablet Take  by mouth nightly. Yes Other, MD Yenifer  
predniSONE (DELTASONE) 1 mg tablet Take  by mouth daily (with breakfast). Yes Other, MD Yenifer  
oxyCODONE-acetaminophen (PERCOCET)  mg per tablet Take  by mouth. Yes Other, MD Yenifer  
 
 
Allergies Allergen Reactions  Pcn [Penicillins] Other (comments)  
  unknown  Tetanus And Diphther. Tox (Pf) Other (comments)  
  unknown Review of Systems Gen: No fever, chills, malaise, weight loss/gain. Heent: No headache, rhinorrhea, epistaxis, ear pain, hearing loss, sinus pain, neck pain/stiffness, sore throat. Heart: No chest pain, palpitations, MCCAIN, pnd, or orthopnea. Resp: +cough, no hemoptysis, wheezing and shortness of breath. GI: No nausea, vomiting, diarrhea, constipation, melena or hematochezia. : No urinary obstruction, dysuria or hematuria. Derm: No rash, new skin lesion or pruritis. Musc/skeletal: no bone or joint complains. Vasc: No edema, cyanosis or claudication. Neuro: No unilateral weakness, numbness, tingling. No seizures. Physical Exam:  
 
Physical Exam: 
Visit Vitals /58 (BP 1 Location: Left arm, BP Patient Position: At rest) Pulse 85 Temp 100.3 °F (37.9 °C) Resp 18 Ht 5' 10\" (1.778 m) Wt 56.7 kg (125 lb) SpO2 98% BMI 17.94 kg/m² O2 Device: Room air Temp (24hrs), Av.5 °F (38.1 °C), Min:98.7 °F (37.1 °C), Max:102.2 °F (39 °C) No intake/output data recorded. No intake/output data recorded. General:  Awake, cooperative, no distress. Head:  Normocephalic, without obvious abnormality, atraumatic. Eyes:  Conjunctivae/corneas clear, sclera anicteric, EOMs intact. Neck: Supple, symmetrical, trachea midline, no adenopathy. Lungs:   Clear to auscultation bilaterally. Heart:  Regular rate and rhythm, S1, S2 normal, no murmur, click, rub or gallop. Abdomen: Soft, non-tender. Bowel sounds normal. No masses,  No organomegaly. Extremities: Extremities normal, atraumatic, no cyanosis or edema. Capillary refill normal.  
Pulses: 2+ and symmetric all extremities. Skin: Skin color pink, turgor normal. No rashes or lesions Neurologic: Grossly intact. No focal motor or sensory deficit. ER physician reported normal rectal tone. Labs Reviewed: All lab results for the last 24 hours reviewed. Recent Results (from the past 24 hour(s)) POC LACTIC ACID Collection Time: 05/04/19  8:51 PM  
Result Value Ref Range Lactic Acid (POC) 1.97 0.40 - 2.00 mmol/L  
CBC WITH AUTOMATED DIFF Collection Time: 05/04/19  8:58 PM  
Result Value Ref Range WBC 7.9 4.6 - 13.2 K/uL  
 RBC 3.68 (L) 4.20 - 5.30 M/uL  
 HGB 11.1 (L) 12.0 - 16.0 g/dL HCT 34.6 (L) 35.0 - 45.0 % MCV 94.0 74.0 - 97.0 FL  
 MCH 30.2 24.0 - 34.0 PG  
 MCHC 32.1 31.0 - 37.0 g/dL  
 RDW 15.3 (H) 11.6 - 14.5 % PLATELET 521 814 - 096 K/uL MPV 10.2 9.2 - 11.8 FL  
 NEUTROPHILS 86 (H) 40 - 73 % LYMPHOCYTES 7 (L) 21 - 52 % MONOCYTES 7 3 - 10 % EOSINOPHILS 0 0 - 5 % BASOPHILS 0 0 - 2 %  
 ABS. NEUTROPHILS 6.8 1.8 - 8.0 K/UL  
 ABS. LYMPHOCYTES 0.5 (L) 0.9 - 3.6 K/UL  
 ABS. MONOCYTES 0.6 0.05 - 1.2 K/UL  
 ABS. EOSINOPHILS 0.0 0.0 - 0.4 K/UL  
 ABS. BASOPHILS 0.0 0.0 - 0.1 K/UL  
 DF AUTOMATED CARDIAC PANEL,(CK, CKMB & TROPONIN) Collection Time: 05/04/19  8:58 PM  
Result Value Ref Range  26 - 192 U/L  
 CK - MB 1.2 <3.6 ng/ml CK-MB Index 0.9 0.0 - 4.0 %  Troponin-I, QT <0.02 0.0 - 0.408 NG/ML  
METABOLIC PANEL, COMPREHENSIVE  
 Collection Time: 05/04/19  8:58 PM  
Result Value Ref Range Sodium 137 136 - 145 mmol/L Potassium 3.9 3.5 - 5.5 mmol/L Chloride 103 100 - 108 mmol/L  
 CO2 24 21 - 32 mmol/L Anion gap 10 3.0 - 18 mmol/L Glucose 150 (H) 74 - 99 mg/dL BUN 18 7.0 - 18 MG/DL Creatinine 1.01 0.6 - 1.3 MG/DL  
 BUN/Creatinine ratio 18 12 - 20 GFR est AA >60 >60 ml/min/1.73m2 GFR est non-AA 52 (L) >60 ml/min/1.73m2 Calcium 8.4 (L) 8.5 - 10.1 MG/DL Bilirubin, total 0.4 0.2 - 1.0 MG/DL  
 ALT (SGPT) 19 13 - 56 U/L  
 AST (SGOT) 21 15 - 37 U/L Alk. phosphatase 52 45 - 117 U/L Protein, total 6.5 6.4 - 8.2 g/dL Albumin 3.5 3.4 - 5.0 g/dL Globulin 3.0 2.0 - 4.0 g/dL A-G Ratio 1.2 0.8 - 1.7 EKG, 12 LEAD, INITIAL Collection Time: 05/04/19  9:26 PM  
Result Value Ref Range Ventricular Rate 100 BPM  
 Atrial Rate 100 BPM  
 P-R Interval 172 ms QRS Duration 128 ms Q-T Interval 370 ms QTC Calculation (Bezet) 477 ms Calculated P Axis 67 degrees Calculated R Axis 46 degrees Calculated T Axis 63 degrees Diagnosis Sinus rhythm with occasional premature ventricular complexes Right bundle branch block Septal infarct , age undetermined Abnormal ECG No previous ECGs available URINALYSIS W/ RFLX MICROSCOPIC Collection Time: 05/04/19  9:56 PM  
Result Value Ref Range Color YELLOW Appearance CLEAR Specific gravity 1.018 1.005 - 1.030    
 pH (UA) 7.0 5.0 - 8.0 Protein 30 (A) NEG mg/dL Glucose NEGATIVE  NEG mg/dL Ketone TRACE (A) NEG mg/dL Bilirubin NEGATIVE  NEG Blood TRACE (A) NEG Urobilinogen 1.0 0.2 - 1.0 EU/dL Nitrites NEGATIVE  NEG Leukocyte Esterase NEGATIVE  NEG    
URINE MICROSCOPIC ONLY Collection Time: 05/04/19  9:56 PM  
Result Value Ref Range WBC 0 to 3 0 - 5 /hpf  
 RBC 0 to 3 0 - 5 /hpf Epithelial cells 0 0 - 5 /lpf Bacteria FEW (A) NEG /hpf Result Information Status: Final result (Exam End: 5/5/2019 00:55) Provider Status: Open Study Result EXAM: CT head 
  INDICATION: Decreased alertness 
  
COMPARISON: None. 
  
TECHNIQUE: Axial CT imaging of the head was performed without intravenous 
contrast. One or more dose reduction techniques were used on this CT: automated 
exposure control, adjustment of the mAs and/or kVp according to patient size, 
and iterative reconstruction techniques. The specific techniques used on this CT exam have been documented in the patient's electronic medical record. 
  
Digital Imaging and Communications in Medicine (DICOM) format image data are 
available to nonaffiliated external healthcare facilities or entities on a 
secure, media free, reciprocally searchable basis with patient authorization for 
at least a 12 month period after this study. _______________ 
  
FINDINGS: 
  
BRAIN AND POSTERIOR FOSSA: The sulci, folia, ventricles and basal cisterns are 
within normal limits for the patient's age with atrophy present. There is no 
intracranial hemorrhage, mass effect, or midline shift. Mild periventricular 
low-attenuation is noted. Though nonspecific this is most often seen with 
chronic small vessel ischemic change. Otherwise, there are no  areas of abnormal 
parenchymal attenuation. 
  
EXTRA-AXIAL SPACES AND MENINGES: There are no abnormal extra-axial fluid 
collections. 
  
CALVARIUM: Intact. 
  
SINUSES: Opacification and mucoperiosteal thickening throughout ethmoid air 
cells and involving sphenoid air cells also, chronic appearing. No sinus 
air-fluid levels. 
  
OTHER: None. 
  
_______________ 
  
IMPRESSION IMPRESSION: 
  
No acute intracranial abnormalities. 
  
Chronic sinusitis. CT spine: formal read pending, ER reported normal wet read Study Result  
 
============================ 
[facility] 
============================ 
  
EXAM:AP chest xray 
  INDICATION: Fever, tachycardia 
  
 COMPARISON: None 
  
FINDINGS:  
  
The cardiomediastinal silhouette is normal. Appears to be a prosthetic heart 
valve. The lungs are clear. The pulmonary vasculature is not engorged. The 
costophrenic angles are well defined. No pneumothorax is detected. 
  
There are Bosch type rods at the thoracolumbar spine. . The trachea is 
midline. Suggestion of soft tissue ossification projecting adjacent to proximal 
left humerus. 
  
IMPRESSION IMPRESSION:  
  
No acute cardiopulmonary process.

## 2019-05-05 NOTE — ED NOTES
Pt hourly rounding competed. Safety Pt (X) resting on stretcher with side rails up and call bell in reach. () in chair 
  () in parents arms. Toileting Pt offered ()Bedpan 
   (X)Assistance to Restroom 
   ()Urinal 
Ongoing UpdatesUpdated on plan of care and status of test results. Pain Management Inquired as to comfort and offered comfort measures: 
  () warm blankets 
 () dimmed lights

## 2019-05-05 NOTE — PROGRESS NOTES
Chart reviewed by CM on call. Met with pt and family at bedside.  states she is back to baseline and require minimal assistance with ADLs with no DME. Confirms he will follow up with PCP in [place]. The plan is to drive to [facility] after discharge today. Family and pt do not identify CM needs at this time. Oral and Written notification given to patient and/or caregiver informing them that they are currently an Outpatient receiving care in our facility. Outpatient services include Observation Services under [place] and [place] requirements. Care Management Interventions PCP Verified by CM: Yes Current Support Network: Lives with Spouse Confirm Follow Up Transport: Family Discharge Location Discharge Placement: Home Reason for Admission:   delirium RRAT Score:  10 Plan for utilizing home health:  Not at this time Current Advanced Directive/Advance Care Plan:  
 
Likelihood of Readmission:  low Transition of Care Plan:   Home with support of family and provider follow up

## 2019-05-05 NOTE — CONSULTS
[facility] Consult Note      Patient: Franki Marquis MRN: [id number]  SSN: [id number]    YOB: 1933  Age: 80 y.o. Sex: female    Date of Consultation: 05/05/2019  Referring Physician: Dr. Adela Tabor  Reason for Consultation: transient AV block. HPI:  I was asked by Dr. Adela Tabor to see this pleasant patient for transient AV block. Franki Marquis is a 80years old pleasant patient admitted to the hospital for fever/delerium and today she has 4.2 seconds pause consistent with transient CHB and then converted back to baseline NSR with RBBB. Patient and family is from [place] and prefer to go home ASAP, since patient is baseline to her normal activities. PMH significant for CABG, AVR and MVR in [place].     Past Medical History:   Diagnosis Date    Dementia     Hyperlipidemia     Hypothyroidism     Rheumatoid arthritis ([facility] [address]      Past Surgical History:   Procedure Laterality Date    HX AORTIC VALVE REPLACEMENT      HX ARTERIAL BYPASS      HX MITRAL VALVE REPLACEMENT       Current Facility-Administered Medications   Medication Dose Route Frequency    acetaminophen (TYLENOL) tablet 650 mg  650 mg Oral Q6H PRN    sodium chloride (NS) flush 5-40 mL  5-40 mL IntraVENous Q8H    sodium chloride (NS) flush 5-40 mL  5-40 mL IntraVENous PRN    heparin (porcine) injection 5,000 Units  5,000 Units SubCUTAneous Q8H    azithromycin (ZITHROMAX) 500 mg in 0.9% sodium chloride 250 mL IVPB  500 mg IntraVENous Q24H    oxyCODONE-acetaminophen (PERCOCET) 5-325 mg per tablet 1 Tab  1 Tab Oral Q6H PRN    predniSONE (DELTASONE) tablet 5 mg  5 mg Oral DAILY WITH BREAKFAST    pravastatin (PRAVACHOL) tablet 10 mg  10 mg Oral QHS    levothyroxine (SYNTHROID) tablet 50 mcg  50 mcg Oral 6am    aspirin tablet 325 mg  325 mg Oral DAILY    folic acid (FOLVITE) tablet 1 mg  1 mg Oral DAILY    0.9% sodium chloride infusion  125 mL/hr IntraVENous CONTINUOUS    sodium chloride (NS) flush 5-10 mL  5-10 mL IntraVENous PRN       Allergies and Intolerances: Allergies   Allergen Reactions    Pcn [Penicillins] Other (comments)     unknown    Tetanus And Diphther. Tox (Pf) Other (comments)     unknown       Family History:   History reviewed. No pertinent family history. Social History:   She  reports that she has never smoked. She does not have any smokeless tobacco history on file. She  reports that she drank alcohol. Review of Systems:     Review of Systems  Gen: No fever, chills, malaise, weight loss/gain. + fever and altered mental status  Heent: No headache, rhinorrhea, epistaxis, ear pain, hearing loss, sinus pain, neck pain/stiffness, sore throat. Heart: No chest pain, palpitations, MCCAIN, pnd, or orthopnea. Resp: No cough, hemoptysis, wheezing and shortness of breath. GI: No nausea, vomiting, diarrhea, constipation, melena or hematochezia. : No urinary obstruction, dysuria or hematuria. Derm: No rash, new skin lesion or pruritis. Musc/skeletal: no bone or joint complains. Vasc: No edema, cyanosis or claudication. Endo: No heat/cold intolerance, no polyuria,polydipsia or polyphagia. Neuro: No unilateral weakness, numbness, tingling. No seizures. Heme: No easy bruising or bleeding. Physical:   Patient Vitals for the past 6 hrs:   Temp Pulse Resp BP SpO2   05/05/19 1113 98.5 °F (36.9 °C) 66 18 115/42 95 %   05/05/19 0657 98.6 °F (37 °C) 75 18 109/43 94 %         Exam:   General Appearance: Comfortable, not using accessory muscles of respiration. HEENT: MC. HEAD: Atraumatic  NECK: No JVD, no thyroidomeglay. CAROTIDS: clear  LUNGS: Clear bilaterally. HEART: S1+S2, normal tissue prosthetic valve sounds     ABD: Non-tender, BS Audible    EXT: No edema, and no cysnosis. VASCULAR EXAM: Pulses are intact. PSYCHIATRIC EXAM: Mood is appropriate. MUSCULOSKELETAL: Grossly no joint deformity. NEUROLOGICAL: Motor and sensory sytem intact and Cranial nerves II-XII intact.     Review of Data:   LABS:   Lab Results   Component Value Date/Time    WBC 7.9 05/04/2019 08:58 PM    HGB 11.1 (L) 05/04/2019 08:58 PM    HCT 34.6 (L) 05/04/2019 08:58 PM    PLATELET 210 83/25/0304 08:58 PM     Lab Results   Component Value Date/Time    Sodium 137 05/04/2019 08:58 PM    Potassium 3.9 05/04/2019 08:58 PM    Chloride 103 05/04/2019 08:58 PM    CO2 24 05/04/2019 08:58 PM    Glucose 150 (H) 05/04/2019 08:58 PM    BUN 18 05/04/2019 08:58 PM    Creatinine 1.01 05/04/2019 08:58 PM     No results found for: CHOL, CHOLX, CHLST, CHOLV, HDL, LDL, LDLC, DLDLP, TGLX, TRIGL, TRIGP  No results found for: GPT  No results found for: HBA1C, HGBE8, GTH5TXOF, JVK9WDED      Cardiology Procedures:   Results for orders placed or performed during the hospital encounter of 05/04/19   EKG, 12 LEAD, INITIAL   Result Value Ref Range    Ventricular Rate 100 BPM    Atrial Rate 100 BPM    P-R Interval 172 ms    QRS Duration 128 ms    Q-T Interval 370 ms    QTC Calculation (Bezet) 477 ms    Calculated P Axis 67 degrees    Calculated R Axis 46 degrees    Calculated T Axis 63 degrees    Diagnosis       Sinus rhythm with occasional premature ventricular complexes  Right bundle branch block  Septal infarct , age undetermined  Abnormal ECG  No previous ECGs available             Impression / Plan:    Patient Active Problem List   Diagnosis Code    Delirium R41.0    Fever R50.9       A/P  Transient complete heart at 5:02 AM for 4.2 seconds  Baseline rhythm is RBBB  CABG  AVR- tissue  MVR- tissue    Plan;  Discussed with patient and family in detail. If has symptomatic bradycardia/high degree AVB then will need PPM.  Pt is not on AV bennie blocking agent. Pt and family want to go back to [place], they understand the risk of recurrent heart block during travel, prefer to go back in her hometown where they will see cardiologist ASAP for further management/event monitor or if pt become symptomatic will go to ER.       Signed By: Yossi Cooper MD     May 5, 2019

## 2019-05-06 LAB
BACTERIA SPEC CULT: NORMAL
SERVICE CMNT-IMP: NORMAL

## 2019-05-10 LAB
BACTERIA SPEC CULT: NORMAL
BACTERIA SPEC CULT: NORMAL
SERVICE CMNT-IMP: NORMAL
SERVICE CMNT-IMP: NORMAL

## 2019-05-11 LAB
ATRIAL RATE: 100 BPM
CALCULATED P AXIS, ECG09: 67 DEGREES
CALCULATED R AXIS, ECG10: 46 DEGREES
CALCULATED T AXIS, ECG11: 63 DEGREES
DIAGNOSIS, 93000: NORMAL
P-R INTERVAL, ECG05: 172 MS
Q-T INTERVAL, ECG07: 370 MS
QRS DURATION, ECG06: 128 MS
QTC CALCULATION (BEZET), ECG08: 477 MS
VENTRICULAR RATE, ECG03: 100 BPM